# Patient Record
Sex: MALE | Race: WHITE | NOT HISPANIC OR LATINO | Employment: OTHER | URBAN - METROPOLITAN AREA
[De-identification: names, ages, dates, MRNs, and addresses within clinical notes are randomized per-mention and may not be internally consistent; named-entity substitution may affect disease eponyms.]

---

## 2021-02-16 ENCOUNTER — TELEPHONE (OUTPATIENT)
Dept: UROLOGY | Facility: AMBULATORY SURGERY CENTER | Age: 81
End: 2021-02-16

## 2021-02-16 NOTE — TELEPHONE ENCOUNTER
Call placed to patient and spoke with his wife  Offered patient appointment for 3-8-2021 at 11:30am in our [place] office  Informed the wife that patient may require a cystoscopy and this cannot be done at our [facility]  Wife understands and accepted this appointment in [place]

## 2021-02-16 NOTE — TELEPHONE ENCOUNTER
Reason for appointment/Complaint/Diagnosis : gross hematuria on and off for 2 weeks  pcp requesting appointment soon as possible      Insurance: Medicare a and b  [address] [place] [address]  [id number]    History of Cancer? [place]            If yes, what kind?n/a  Previous urologist?   unknown               Records requested/where?  no  Outside testing/where? no    Location Preference for office visit?  [facility]

## 2021-03-08 ENCOUNTER — PROCEDURE VISIT (OUTPATIENT)
Dept: UROLOGY | Facility: CLINIC | Age: 81
End: 2021-03-08
Payer: MEDICARE

## 2021-03-08 DIAGNOSIS — N13.8 BPH WITH OBSTRUCTION/LOWER URINARY TRACT SYMPTOMS: ICD-10-CM

## 2021-03-08 DIAGNOSIS — R31.0 GROSS HEMATURIA: Primary | ICD-10-CM

## 2021-03-08 DIAGNOSIS — N40.1 BPH WITH OBSTRUCTION/LOWER URINARY TRACT SYMPTOMS: ICD-10-CM

## 2021-03-08 DIAGNOSIS — R97.20 ELEVATED PSA: ICD-10-CM

## 2021-03-08 PROCEDURE — 99203 OFFICE O/P NEW LOW 30 MIN: CPT | Performed by: UROLOGY

## 2021-03-08 PROCEDURE — 52000 CYSTOURETHROSCOPY: CPT | Performed by: UROLOGY

## 2021-03-08 RX ORDER — CIPROFLOXACIN 500 MG/1
500 TABLET, FILM COATED ORAL ONCE
Qty: 1 TABLET | Refills: 0 | Status: SHIPPED | OUTPATIENT
Start: 2021-03-08 | End: 2021-03-08

## 2021-03-08 NOTE — PROGRESS NOTES
[address] [facility]  [facility]  [address]  [place], [address] [place] [address]  [phone]  www  [facility]  org      NAME: Po Herbert  AGE: 78 y o  SEX: male  : 1940   MRN: [id number]    DATE: 3/8/2021  TIME: 12:16 PM    Assessment and Plan :    Intermittent gross hematuria due to BPH with friable vessels  Reassured, and this does not happen very often  No further treatment for this right now  History of elevated PSA:  Recheck PSA in 6 months  He has not had a biopsy and this is been chronic  BPH with obstruction:  Very minimal symptoms, can live with the symptoms  Chief Complaint   No chief complaint on file  History of Present Illness      New patient office visit forgross hematuria:  Patient double booked to undergo cystoscopy when he had his wife called requesting appointment   Set up for cystoscopy  CT scan abdomen and pelvis done at [facility] showed enlarged prostate, normal kidneys without stones and no bladder lesions  Has no major voiding symptoms except for occasional intermittency of stream   He has seen Dr Mcclain in the past for elevated PSA- no biopsy- and was once on BPH meds and couldn't tolerate the side effects  He has had this episode of gross hematuria and 1 other episode 10 years ago  No other episodes  Cystoscopy     Date/Time 3/8/2021 11:50 AM     Performed by  Hiram Soto MD     Authorized by Hiram Soto MD      Universal Protocol:  Consent: Verbal consent obtained  Written consent obtained        Procedure Details:  Procedure type: cystoscopy    Additional Procedure Details: Cystoscopy Procedure Note        Pre-operative Diagnosis: Gross hematuria    Post-operative Diagnosis:  same , due to friable vessels and BPH    Procedure: Flexible cystoscopy    Surgeon: Jennie Rodriguez MD    Anesthesia: 1% Xylocaine per urethra    EBL: Minimal    Complications: none    Procedure Details The risks, benefits, complications, treatment options, and expected outcomes were discussed with the patient  The patient concurred with the proposed plan, giving informed consent  Cystoscopy was performed today under local anesthesia, using sterile technique  The patient was placed in the supine position, prepped with Betadine, and draped in the usual sterile fashion  The flexible cystocope was used to inspect both the urethra and bladder    Findings:  Urethra: normal without stricture  Prostate is moderately occlusive with very friable vessels of the surface and a moderate median lobe component  Bladder:    1+ trabeculatedand there were no stones tumors or other lesions  The orifices were orthotopic and intact  Specimens: none                 Complications:  None           Disposition: To home            Condition:  Stable          The following portions of the patient's history were reviewed and updated as appropriate: allergies, current medications, past family history, past medical history, past social history, past surgical history and problem list   History reviewed  No pertinent past medical history  History reviewed  No pertinent surgical history  shoulder  Review of Systems   Review of Systems   Genitourinary:        As per hospitals       Active Problem List   There is no problem list on file for this patient  Objective   There were no vitals taken for this visit  Physical Exam  Constitutional:       Appearance: Normal appearance  HENT:      Head: Normocephalic and atraumatic  Eyes:      Extraocular Movements: Extraocular movements intact  Neck:      Musculoskeletal: Normal range of motion  Pulmonary:      Effort: Pulmonary effort is normal    Genitourinary:     Penis: Normal        Scrotum/Testes: Normal       Prostate: Normal       Rectum: Normal       Comments: Rectal exam reveals normal tone, no masses and his prostate is about 70-80 g, smooth symmetric benign    No nodules  Musculoskeletal: Normal range of motion  Skin:     Coloration: Skin is not jaundiced or pale  Neurological:      General: No focal deficit present  Mental Status: He is alert and oriented to person, place, and time  Psychiatric:         Mood and Affect: Mood normal          Behavior: Behavior normal          Thought Content:  Thought content normal          Judgment: Judgment normal              Current Medications     Current Outpatient Medications:     ciprofloxacin (Cipro) 500 mg tablet, Take 1 tablet (500 mg total) by mouth once for 1 dose Take 1 dose after  procedure, Disp: 1 tablet, Rfl: 0        Mark Macdonald MD

## 2021-03-15 DIAGNOSIS — C43.59 MELANOMA OF BACK (HCC): Primary | ICD-10-CM

## 2021-04-07 ENCOUNTER — ANESTHESIA EVENT (OUTPATIENT)
Dept: PERIOP | Facility: HOSPITAL | Age: 81
End: 2021-04-07
Payer: MEDICARE

## 2021-04-14 ENCOUNTER — HOSPITAL ENCOUNTER (OUTPATIENT)
Dept: NUCLEAR MEDICINE | Facility: HOSPITAL | Age: 81
Discharge: HOME | End: 2021-04-14
Payer: MEDICARE

## 2021-04-14 ENCOUNTER — ANESTHESIA (OUTPATIENT)
Dept: PERIOP | Facility: HOSPITAL | Age: 81
End: 2021-04-14
Payer: MEDICARE

## 2021-04-14 DIAGNOSIS — C43.59 MELANOMA OF BACK (HCC): ICD-10-CM

## 2021-04-14 PROBLEM — I47.10 PAROXYSMAL SVT (SUPRAVENTRICULAR TACHYCARDIA) (HCC): Status: ACTIVE | Noted: 2021-04-14

## 2021-04-14 PROBLEM — I49.9 DYSRHYTHMIAS: Status: ACTIVE | Noted: 2021-04-14

## 2021-04-14 PROBLEM — I47.1 PAROXYSMAL SVT (SUPRAVENTRICULAR TACHYCARDIA) (HCC): Status: ACTIVE | Noted: 2021-04-14

## 2021-04-14 NOTE — ANESTHESIA PREPROCEDURE EVALUATION
Procedure:  UPPER BACK MELANOMA WIDE EXCISION (Right Back)  UPPER ARM MELANOMA WIDE EXCISION (Right Arm)  SENTINEL NODE BIOPSY (NUC MED INJECTION AT 0800) (Right Axilla)    Relevant Problems   CARDIO   (+) Dysrhythmias   (+) Paroxysmal SVT (supraventricular tachycardia) (HCC)      GI/HEPATIC   (+) GERD (gastroesophageal reflux disease)      PULMONARY   (+) Asthma   (+) COPD (chronic obstructive pulmonary disease) (HCC)      Other   (+) Disease of thyroid gland   (+) Melanoma (HCC)             Anesthesia Plan  ASA Score- 4     Anesthesia Type- general with ASA Monitors.         Additional Monitors:   Airway Plan: ETT.    Comment: Case cancelled after discussing risks and benefits with the surgeon. Further cardiology evaluation is required for optimization of paroxysmal SVT as well as what seems to be a new BBB. He had a cardiology evaluation for fatigue in May of 2020 and wore a 24 hour holter monitor which showed several episodes of non sustained SVT but he was not treated for this. .       Plan Factors-    Chart reviewed.             Induction- intravenous.    Postoperative Plan- Plan for postoperative opioid use. Planned trial extubation    Informed Consent- Anesthetic plan and risks discussed with patient.  I personally reviewed this patient with the CRNA. Discussed and agreed on the Anesthesia Plan with the CRNA..

## 2021-05-05 ENCOUNTER — TELEPHONE (OUTPATIENT)
Dept: UROLOGY | Facility: MEDICAL CENTER | Age: 81
End: 2021-05-05

## 2021-06-08 RX ORDER — DILTIAZEM HYDROCHLORIDE 120 MG/1
120 CAPSULE, EXTENDED RELEASE ORAL 2 TIMES DAILY
COMMUNITY

## 2021-06-08 RX ORDER — METOPROLOL SUCCINATE 50 MG/1
25 TABLET, EXTENDED RELEASE ORAL DAILY
COMMUNITY

## 2021-06-08 RX ORDER — PREDNISONE 1 MG/1
20 TABLET ORAL 2 TIMES DAILY WITH MEALS
COMMUNITY

## 2021-06-08 NOTE — PRE-PROCEDURE INSTRUCTIONS
Pre-Surgery Instructions:   Medication Instructions    albuterol (PROVENTIL HFA,VENTOLIN HFA) 90 mcg/act inhaler Instructed patient per Anesthesia Guidelines   budesonide (PULMICORT) 90 MCG/ACT inhaler Instructed patient per Anesthesia Guidelines   diltiazem (CARDIZEM SR) 120 mg 12 hr capsule Instructed patient per Anesthesia Guidelines   fluticasone-salmeterol (Advair Diskus) 250-50 mcg/dose inhaler Instructed patient per Anesthesia Guidelines   levothyroxine 137 mcg tablet Instructed patient per Anesthesia Guidelines   metoprolol succinate (TOPROL-XL) 50 mg 24 hr tablet Instructed patient per Anesthesia Guidelines   predniSONE 1 mg tablet Instructed patient per Anesthesia Guidelines   zolpidem (Ambien) 5 mg tablet Instructed patient per Anesthesia Guidelines  Pt/pt's wife was instructed that pt should use inhalers the am of surgery  Pt will be taking diltiazem, levothyroxine, toprol, and prednisone the am of surgery with a small sip of water

## 2021-06-10 ENCOUNTER — ANESTHESIA EVENT (OUTPATIENT)
Dept: PERIOP | Facility: HOSPITAL | Age: 81
End: 2021-06-10
Payer: MEDICARE

## 2021-06-13 NOTE — ANESTHESIA PREPROCEDURE EVALUATION
Procedure:  UPPER BACK AND UPPER ARM MELANOMA WIDE EXCISION (Right Back)  SENTINEL NODE BIOPSY (NUC MED INJECTION AT 0900) (Right Axilla)    Relevant Problems   CARDIO  Cardiac eval in Media section  Bundle block is old and stable  Holter (5/21) showed SVT is only short runs and pt started on Diltiazem  Echo 5/20 EF 70% and WNL     (+) Dysrhythmias   (+) Paroxysmal SVT (supraventricular tachycardia) (HCC)      GI/HEPATIC   (+) GERD (gastroesophageal reflux disease)      PULMONARY   (+) Asthma   (+) COPD (chronic obstructive pulmonary disease) (HCC)        Physical Exam    Airway    Mallampati score: III  TM Distance: >3 FB  Neck ROM: full     Dental       Cardiovascular  Cardiovascular exam normal    Pulmonary  Pulmonary exam normal     Other Findings        Anesthesia Plan  ASA Score- 3     Anesthesia Type- general with ASA Monitors  Additional Monitors:   Airway Plan:           Plan Factors-    Chart reviewed  Patient summary reviewed  Induction- intravenous  Postoperative Plan-     Informed Consent- Anesthetic plan and risks discussed with patient  I personally reviewed this patient with the CRNA  Discussed and agreed on the Anesthesia Plan with the CRNA  Elmo Irwin

## 2021-06-14 ENCOUNTER — HOSPITAL ENCOUNTER (OUTPATIENT)
Facility: HOSPITAL | Age: 81
Setting detail: OUTPATIENT SURGERY
Discharge: HOME | End: 2021-06-14
Attending: SURGERY | Admitting: SURGERY
Payer: MEDICARE

## 2021-06-14 ENCOUNTER — ANESTHESIA (OUTPATIENT)
Dept: PERIOP | Facility: HOSPITAL | Age: 81
End: 2021-06-14
Payer: MEDICARE

## 2021-06-14 ENCOUNTER — HOSPITAL ENCOUNTER (OUTPATIENT)
Dept: NUCLEAR MEDICINE | Facility: HOSPITAL | Age: 81
Discharge: HOME | End: 2021-06-14
Attending: SURGERY
Payer: MEDICARE

## 2021-06-14 VITALS
HEIGHT: 70 IN | BODY MASS INDEX: 28.35 KG/M2 | OXYGEN SATURATION: 95 % | HEART RATE: 65 BPM | SYSTOLIC BLOOD PRESSURE: 138 MMHG | WEIGHT: 198 LBS | DIASTOLIC BLOOD PRESSURE: 64 MMHG | TEMPERATURE: 97.9 F | RESPIRATION RATE: 18 BRPM

## 2021-06-14 DIAGNOSIS — C43.59 MALIGNANT MELANOMA OF OTHER PART OF TRUNK (HCC): ICD-10-CM

## 2021-06-14 PROCEDURE — 88307 TISSUE EXAM BY PATHOLOGIST: CPT | Performed by: PATHOLOGY

## 2021-06-14 PROCEDURE — 88305 TISSUE EXAM BY PATHOLOGIST: CPT | Performed by: PATHOLOGY

## 2021-06-14 PROCEDURE — A9541 TC99M SULFUR COLLOID: HCPCS

## 2021-06-14 PROCEDURE — G1004 CDSM NDSC: HCPCS

## 2021-06-14 PROCEDURE — 88342 IMHCHEM/IMCYTCHM 1ST ANTB: CPT | Performed by: PATHOLOGY

## 2021-06-14 PROCEDURE — 78195 LYMPH SYSTEM IMAGING: CPT

## 2021-06-14 PROCEDURE — 88341 IMHCHEM/IMCYTCHM EA ADD ANTB: CPT | Performed by: PATHOLOGY

## 2021-06-14 RX ORDER — HYDROMORPHONE HCL/PF 1 MG/ML
0.2 SYRINGE (ML) INJECTION
Status: DISCONTINUED | OUTPATIENT
Start: 2021-06-14 | End: 2021-06-14 | Stop reason: HOSPADM

## 2021-06-14 RX ORDER — MAGNESIUM HYDROXIDE 1200 MG/15ML
LIQUID ORAL AS NEEDED
Status: DISCONTINUED | OUTPATIENT
Start: 2021-06-14 | End: 2021-06-14 | Stop reason: HOSPADM

## 2021-06-14 RX ORDER — FENTANYL CITRATE 50 UG/ML
INJECTION, SOLUTION INTRAMUSCULAR; INTRAVENOUS AS NEEDED
Status: DISCONTINUED | OUTPATIENT
Start: 2021-06-14 | End: 2021-06-14

## 2021-06-14 RX ORDER — PROPOFOL 10 MG/ML
INJECTION, EMULSION INTRAVENOUS AS NEEDED
Status: DISCONTINUED | OUTPATIENT
Start: 2021-06-14 | End: 2021-06-14

## 2021-06-14 RX ORDER — SUCCINYLCHOLINE/SOD CL,ISO/PF 100 MG/5ML
SYRINGE (ML) INTRAVENOUS AS NEEDED
Status: DISCONTINUED | OUTPATIENT
Start: 2021-06-14 | End: 2021-06-14

## 2021-06-14 RX ORDER — ONDANSETRON 2 MG/ML
4 INJECTION INTRAMUSCULAR; INTRAVENOUS ONCE AS NEEDED
Status: DISCONTINUED | OUTPATIENT
Start: 2021-06-14 | End: 2021-06-14 | Stop reason: HOSPADM

## 2021-06-14 RX ORDER — BUPIVACAINE HYDROCHLORIDE AND EPINEPHRINE 2.5; 5 MG/ML; UG/ML
INJECTION, SOLUTION INFILTRATION; PERINEURAL AS NEEDED
Status: DISCONTINUED | OUTPATIENT
Start: 2021-06-14 | End: 2021-06-14 | Stop reason: HOSPADM

## 2021-06-14 RX ORDER — LIDOCAINE HYDROCHLORIDE 10 MG/ML
INJECTION, SOLUTION EPIDURAL; INFILTRATION; INTRACAUDAL; PERINEURAL AS NEEDED
Status: DISCONTINUED | OUTPATIENT
Start: 2021-06-14 | End: 2021-06-14

## 2021-06-14 RX ORDER — DEXAMETHASONE SODIUM PHOSPHATE 10 MG/ML
INJECTION, SOLUTION INTRAMUSCULAR; INTRAVENOUS AS NEEDED
Status: DISCONTINUED | OUTPATIENT
Start: 2021-06-14 | End: 2021-06-14

## 2021-06-14 RX ORDER — ISOSULFAN BLUE 50 MG/5ML
INJECTION, SOLUTION SUBCUTANEOUS AS NEEDED
Status: DISCONTINUED | OUTPATIENT
Start: 2021-06-14 | End: 2021-06-14 | Stop reason: HOSPADM

## 2021-06-14 RX ORDER — SODIUM CHLORIDE, SODIUM LACTATE, POTASSIUM CHLORIDE, CALCIUM CHLORIDE 600; 310; 30; 20 MG/100ML; MG/100ML; MG/100ML; MG/100ML
INJECTION, SOLUTION INTRAVENOUS CONTINUOUS PRN
Status: DISCONTINUED | OUTPATIENT
Start: 2021-06-14 | End: 2021-06-14

## 2021-06-14 RX ORDER — CEFAZOLIN SODIUM 2 G/50ML
2000 SOLUTION INTRAVENOUS ONCE
Status: COMPLETED | OUTPATIENT
Start: 2021-06-14 | End: 2021-06-14

## 2021-06-14 RX ORDER — ONDANSETRON 2 MG/ML
INJECTION INTRAMUSCULAR; INTRAVENOUS AS NEEDED
Status: DISCONTINUED | OUTPATIENT
Start: 2021-06-14 | End: 2021-06-14

## 2021-06-14 RX ORDER — EPHEDRINE SULFATE 50 MG/ML
INJECTION INTRAVENOUS AS NEEDED
Status: DISCONTINUED | OUTPATIENT
Start: 2021-06-14 | End: 2021-06-14

## 2021-06-14 RX ORDER — DEXAMETHASONE SODIUM PHOSPHATE 4 MG/ML
8 INJECTION, SOLUTION INTRA-ARTICULAR; INTRALESIONAL; INTRAMUSCULAR; INTRAVENOUS; SOFT TISSUE ONCE AS NEEDED
Status: DISCONTINUED | OUTPATIENT
Start: 2021-06-14 | End: 2021-06-14 | Stop reason: HOSPADM

## 2021-06-14 RX ORDER — OXYCODONE HYDROCHLORIDE AND ACETAMINOPHEN 5; 325 MG/1; MG/1
1 TABLET ORAL EVERY 4 HOURS PRN
Qty: 15 TABLET | Refills: 0 | Status: SHIPPED | OUTPATIENT
Start: 2021-06-14

## 2021-06-14 RX ORDER — FENTANYL CITRATE/PF 50 MCG/ML
25 SYRINGE (ML) INJECTION
Status: DISCONTINUED | OUTPATIENT
Start: 2021-06-14 | End: 2021-06-14 | Stop reason: HOSPADM

## 2021-06-14 RX ADMIN — PHENYLEPHRINE HYDROCHLORIDE 100 MCG: 10 INJECTION INTRAVENOUS at 12:29

## 2021-06-14 RX ADMIN — FENTANYL CITRATE 50 MCG: 50 INJECTION, SOLUTION INTRAMUSCULAR; INTRAVENOUS at 11:48

## 2021-06-14 RX ADMIN — PHENYLEPHRINE HYDROCHLORIDE 100 MCG: 10 INJECTION INTRAVENOUS at 11:20

## 2021-06-14 RX ADMIN — PROPOFOL 50 MG: 10 INJECTION, EMULSION INTRAVENOUS at 11:45

## 2021-06-14 RX ADMIN — PHENYLEPHRINE HYDROCHLORIDE 100 MCG: 10 INJECTION INTRAVENOUS at 11:57

## 2021-06-14 RX ADMIN — PROPOFOL 150 MG: 10 INJECTION, EMULSION INTRAVENOUS at 10:19

## 2021-06-14 RX ADMIN — FENTANYL CITRATE 50 MCG: 50 INJECTION, SOLUTION INTRAMUSCULAR; INTRAVENOUS at 10:19

## 2021-06-14 RX ADMIN — Medication 120 MG: at 10:19

## 2021-06-14 RX ADMIN — ONDANSETRON 4 MG: 2 INJECTION INTRAMUSCULAR; INTRAVENOUS at 10:19

## 2021-06-14 RX ADMIN — EPHEDRINE SULFATE 5 MG: 50 INJECTION, SOLUTION INTRAVENOUS at 10:42

## 2021-06-14 RX ADMIN — DEXAMETHASONE SODIUM PHOSPHATE 4 MG: 10 INJECTION, SOLUTION INTRAMUSCULAR; INTRAVENOUS at 10:19

## 2021-06-14 RX ADMIN — PHENYLEPHRINE HYDROCHLORIDE 200 MCG: 10 INJECTION INTRAVENOUS at 12:00

## 2021-06-14 RX ADMIN — EPHEDRINE SULFATE 5 MG: 50 INJECTION, SOLUTION INTRAVENOUS at 10:32

## 2021-06-14 RX ADMIN — PHENYLEPHRINE HYDROCHLORIDE 100 MCG: 10 INJECTION INTRAVENOUS at 11:41

## 2021-06-14 RX ADMIN — EPHEDRINE SULFATE 5 MG: 50 INJECTION, SOLUTION INTRAVENOUS at 10:52

## 2021-06-14 RX ADMIN — EPHEDRINE SULFATE 5 MG: 50 INJECTION, SOLUTION INTRAVENOUS at 12:09

## 2021-06-14 RX ADMIN — PROPOFOL 50 MG: 10 INJECTION, EMULSION INTRAVENOUS at 11:46

## 2021-06-14 RX ADMIN — EPHEDRINE SULFATE 5 MG: 50 INJECTION, SOLUTION INTRAVENOUS at 11:03

## 2021-06-14 RX ADMIN — PHENYLEPHRINE HYDROCHLORIDE 100 MCG: 10 INJECTION INTRAVENOUS at 12:26

## 2021-06-14 RX ADMIN — EPHEDRINE SULFATE 5 MG: 50 INJECTION, SOLUTION INTRAVENOUS at 10:39

## 2021-06-14 RX ADMIN — SODIUM CHLORIDE, SODIUM LACTATE, POTASSIUM CHLORIDE, AND CALCIUM CHLORIDE: .6; .31; .03; .02 INJECTION, SOLUTION INTRAVENOUS at 10:14

## 2021-06-14 RX ADMIN — CEFAZOLIN SODIUM 2000 MG: 2 SOLUTION INTRAVENOUS at 10:14

## 2021-06-14 RX ADMIN — FENTANYL CITRATE 50 MCG: 50 INJECTION, SOLUTION INTRAMUSCULAR; INTRAVENOUS at 10:42

## 2021-06-14 RX ADMIN — PHENYLEPHRINE HYDROCHLORIDE 200 MCG: 10 INJECTION INTRAVENOUS at 12:12

## 2021-06-14 RX ADMIN — LIDOCAINE HYDROCHLORIDE 100 MG: 10 INJECTION, SOLUTION EPIDURAL; INFILTRATION; INTRACAUDAL; PERINEURAL at 10:19

## 2021-06-14 NOTE — OP NOTE
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 ([address] ) [C43 59]    Procedure(s) (LRB):  UPPER BACK AND UPPER ARM MELANOMA WIDE EXCISION (Right)  SENTINEL NODE BIOPSY (NUC MED INJECTION AT 0900) RIGHT AXIALLARY X3 (Right)    Specimen(s):  ID Type Source Tests Collected by Time Destination   1 : right upper back wide excision melanoma,  Tissue Back TISSUE EXAM Sandy Arteaga, DO 2021 10:52 AM    2 : right upper arm wide excision melanoma Tissue Arm, Right TISSUE EXAM Sandy Lentzgeeta, DO 2021 11:21 AM    3 : [place] lymph node #1 Tissue Lymph Node, [place] TISSUE EXAM Sandykem Arteaga, DO 2021 12:23 PM    4 : [place] lymph node #2 Tissue Lymph Node, [place] TISSUE EXAM Sandykem Arteaga, DO 2021 12:29 PM    5 : Right axillary tissue Tissue Axillary TISSUE EXAM Sandy Lentzgeeta, DO 2021 12:30 PM    6 : sentinel lymph node #3 Tissue Lymph Node, [place] TISSUE EXAM Sandy Lentzgeeta, DO 2021 12:34 PM        Estimated Blood Loss:   Minimal    Drains:  * No LDAs found *    Anesthesia Type:   General    Operative Indications:  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 ([facility] ) [C43 59]  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    Operative Findings:  Old scar of right upper back and scar the right arm consistent with prior biopsy and pathology reports of melanoma  The axilla has no enlarged lymph nodes  Complications:   None    Procedure and Technique:  The patient is identified preoperatively and laterality site was with the patient    After lymphoscintigraphy was performed to the right axillary site was also confirmed for right axillary node biopsy  The patient is taken to the operating room and then identified and a time-out was performed with all members of the team present  The patient was placed under general endotracheal anesthesia and then placed left lateral recumbent position with all pressure padded  His right upper back and right arm was prepped in a sterile manner ChloraPrep  Then local anesthetic 0 25 Marcaine with epinephrine is instilled at the back  Three and 4 cm margins were taken around the old scar and a circular incision was down to the deep fatty tissue  The specimen of right upper back measured 5 x 4 x 1 5 cm  It was sent for specimen after marking short dark suture superior and short suture lateral   The wound was then closed with 2 layers of interrupted 2-0 Vicryl suture 2 layers of 3-0 Vicryl suture and 1 layer interrupted vertical mattress 3-0 Ethilon suture  Undermining of the tissue was performed prior to closure  We then proceeded to the right upper arm and I measured 2 cm around the entire biopsy site and local anesthetic 0 25% Marcaine with epinephrine is instilled at this area  An elliptical incision was made including margin  I dissected down to the deep fatty tissue of the right upper arm  A short suture was used to drea the proximal aspect and long short suture was used to drea anterior aspect specimen  The specimen measured 3 5 x 3 6 x 1 5 cm  Was closed with layers of interrupted 2-0 Vicryl suture 1 layer with 3-0 Vicryl suture and then 3-0 Ethilon suture in a vertical mattress fashion was used to close skin  Gauze dressings were placed each of these areas sponge needle sharp instrument counts were correct x2 for each of these areas  All drapes were taken down everyone changed gowns and gloves the patient was placed in the supine position with his right arm out to his side and pressure points padded    The right axilla was then prepped and draped in a sterile manner using ChloraPrep  Local anesthetic 0 25% Marcaine epinephrine instilled at the inferior hairline axilla  Using Neoprobe identified an area of increased activity and a curvilinear incision was made  I dissected down the area of increased activity and the 1st sentinel lymph node had increased activity this was excised using sharp dissection and hemoclips were used for hemostasis the ex vivo counts for sentinel lymph node 1 were 249, 292, 267, and 307  I then noted in the other area of increased activity this was dissected free from the adjacent tissue and hemoclips were used for hemostasis the sentinel lymph node 2 ex vivo counts were 137, 167, 148, 143  I then noted a no other area of increased activity in the right axilla   I dissected this area tissue free from the adjacent tissue and hemoclips were used for hemostasis  The ex vivo counts were 385, 431, and 445  There was no other area of increased activity the basin counts were less than 20  I then irrigated with saline lavage and suction out the irrigant the wound was closed with 1 layer of 2-0 Vicryl suture then 3-0 Vicryl suture and then 4 Vicryl suture for skin and a gauze dressing was applied  Patient tolerated the procedure well sponge needle sharp instrument counts were correct x2  Please note that prior to the beginning of the procedure 5 mL of Lymphazurin blue was injected at the right upper back scar area  None of the lymph nodes revealed blue dye  Patient was extubated and transferred to recovery room in satisfactory stable condition       I was present for the entire procedure and A physician assistant was required during the procedure for retraction tissue handling,dissection and suturing    Patient Disposition:  PACU     SIGNATURE: Yash Johnston DO  DATE: June 14, 2021  TIME: 12:58 PM

## 2021-06-14 NOTE — DISCHARGE INSTRUCTIONS
Remove dressings in 48 hours  No heavy lifting greater than 15 lb  No strenuous activity especially with the right arm  May shower with soap water after 48 hours  Cover the area as needed  Call if any increased pain redness or drainage

## 2021-06-14 NOTE — H&P
H&P Exam - Keshav Fox 78 y o  male MRN: [id number]    Unit/Bed#: [facility] Encounter: [id number]    Assessment:  Melanoma of the right upper back and right arm    Plan: Wide excision of the melanoma of the righrt upper back and right arm with sentinel node biopsy for the right upper back area  History of Present Illness   The pt presents with c/o a changing skin lesion of the right upper back and upper arm   The biopsy revealed a melanoma in situ of the upper arm and a melanoma of the right upper back area  Denies any drainage or problems from the biopsy sites  Review of Systems   Constitutional: Negative  HENT: Negative  Respiratory: Positive for shortness of breath and wheezing  Cardiovascular: Negative  Gastrointestinal: Negative  Genitourinary: Negative  Neurological: Negative  Hematological: Negative  Historical Information   Past Medical History:   Diagnosis Date    Asthma     BBB (bundle branch block)     Chronic kidney disease     Pt's last eGFR was 58 9 2/21; Latest from 4/19/21 is 52    COPD (chronic obstructive pulmonary disease) ([facility] [address] )     Disease of thyroid gland     Dysphagia     Fatigue 05/2020    Pt went to see cardiologist for fatigue  Had a full cardiac work up   GERD (gastroesophageal reflux disease)     Pt no longer has issues   History of elevated PSA     Pt is being followed by Urology   [place]     Melanoma ([facility] [address] )     Monoclonal gammopathy     Pt is asymptomatic- followed by Hem/Onc   Osteopenia     Osteoporosis     Prediabetes     Requires supplemental oxygen     Pt has home O2 available at home but has not had to use in over 5 months per pt's wife      Shortness of breath     Sleep difficulties     Pt has a hard time staying asleep- pt uses ambien nightly    [facility]     Tarsal tunnel syndrome     bilateral feet    Wears glasses     Zenker diverticula     Pt had surgically closed  Past Surgical History:   Procedure Laterality Date    CATARACT EXTRACTION Bilateral     COLONOSCOPY      CRICOPHARYNGEAL MYOTOMY      EGD      2016, 2017    PARAESOPHAGEAL HERNIA REPAIR      SKIN BIOPSY      TONSILLECTOMY       Social History   Social History     Substance and Sexual Activity   Alcohol Use Yes    Alcohol/week: 1 0 - 2 0 standard drinks    Types: 1 - 2 Cans of beer per week     Social History     Substance and Sexual Activity   Drug Use Not Currently     Social History     Tobacco Use   Smoking Status Former Smoker    Types: Cigarettes   Smokeless Tobacco Never Used   Tobacco Comment    Pt quit when he was 43years old     E-Cigarette Use: Never User     E-Cigarette/Vaping Substances       Family History: History reviewed  No pertinent family history  Meds/Allergies   PTA meds:   Prior to Admission Medications   Prescriptions Last Dose Informant Patient Reported? Taking? albuterol (PROVENTIL HFA,VENTOLIN HFA) 90 mcg/act inhaler   Yes Yes   Sig: Inhale 2 puffs every 6 (six) hours as needed for wheezing   budesonide (PULMICORT) 90 MCG/ACT inhaler   Yes Yes   Sig: Inhale 1 puff 2 (two) times a day   diltiazem (CARDIZEM SR) 120 mg 12 hr capsule   Yes Yes   Sig: Take 120 mg by mouth 2 (two) times a day   fluticasone-salmeterol (Advair Diskus) 250-50 mcg/dose inhaler   Yes Yes   Sig: Inhale 1 puff 2 (two) times a day Rinse mouth after use  levothyroxine 137 mcg tablet   Yes Yes   Sig: Take 137 mcg by mouth daily   metoprolol succinate (TOPROL-XL) 50 mg 24 hr tablet 6/14/2021 at Unknown time  Yes Yes   Sig: Take 25 mg by mouth daily Pt is to take for 5 days before surgery and day of surgery  Prescribed by Dr Kat Puente     predniSONE 1 mg tablet   Yes Yes   Sig: Take 20 mg by mouth 2 (two) times a day with meals   tadalafil (CIALIS) 20 MG tablet   Yes No   Sig: Take 20 mg by mouth daily as needed for erectile dysfunction    zolpidem (Ambien) 5 mg tablet   Yes Yes   Sig: Take 5 mg by mouth daily at bedtime as needed for sleep      Facility-Administered Medications: None     Allergies   Allergen Reactions    Iodinated Diagnostic Agents Anaphylaxis    Metrizamide Anaphylaxis       Objective   First Vitals:   Height: 5' 10" (177 8 cm) (06/08/21 1119)  Weight - Scale: 89 8 kg (198 lb) (06/08/21 1119)    Current Vitals:   Height: 5' 10" (177 8 cm) (06/08/21 1119)  Weight - Scale: 89 8 kg (198 lb) (06/08/21 1119)    No intake or output data in the 24 hours ending 06/14/21 0811    Invasive Devices     None                 Physical Exam  Constitutional:       Appearance: Normal appearance  HENT:      Nose: Nose normal       Mouth/Throat:      Mouth: Mucous membranes are moist    Cardiovascular:      Rate and Rhythm: Normal rate and regular rhythm  Pulses: Normal pulses  Heart sounds: Normal heart sounds  Pulmonary:      Effort: Pulmonary effort is normal       Breath sounds: Wheezing and rhonchi present  Abdominal:      General: Abdomen is flat  Palpations: Abdomen is soft  There is no mass  Tenderness: There is no abdominal tenderness  Musculoskeletal:         General: No swelling or tenderness  Cervical back: Normal range of motion and neck supple  Skin:     General: Skin is warm  Comments: Scar noted of the right upper back and right upper arm areas  Neurological:      General: No focal deficit present  Mental Status: He is alert and oriented to person, place, and time

## 2021-06-14 NOTE — ANESTHESIA POSTPROCEDURE EVALUATION
Post-Op Assessment Note    CV Status:  Stable  Pain Score: 0    Pain management: adequate     Mental Status:  Arousable   Hydration Status:  Euvolemic   PONV Controlled:  Controlled   Airway Patency:  Patent   Two or more mitigation strategies used for obstructive sleep apnea   Post Op Vitals Reviewed: Yes      Staff: CRNA         No complications documented      BP  176/79    Temp   97 8   Pulse  66   Resp   16   SpO2   98

## 2021-06-14 NOTE — DISCHARGE SUMMARY
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 ([facility] [address] ) [C43 59]    HPI:  The patient has a history of melanoma of the right upper back melanoma situ right upper he presents for wide excision thereof and a sentinel node biopsy  Procedures Performed: No orders of the defined types were placed in this encounter  Summary of Hospital Course: The patient presents for wide excision of melanoma of the upper back and right arm and right axillary sentinel node biopsy after lymphoscintigraphy was performed  The patient underwent the aforementioned procedure without difficulty and was discharged later on 06/14 without incident  Significant Findings, Care, Treatment and Services Provided:  Melanoma of the right upper back and right upper arm  Complications:  None    Discharge Diagnosis:  Melanoma right upper back and right upper arm    Medical Problems     Resolved Problems  Date Reviewed: 3/8/2021    None                Condition at Discharge: good         Discharge instructions/Information to patient and family:   See after visit summary for information provided to patient and family  Provisions for Follow-Up Care:  See after visit summary for information related to follow-up care and any pertinent home health orders  PCP: Eliana Bill MD    Disposition: Home    Planned Readmission: No      Discharge Statement   I spent 15 minutes discharging the patient  This time was spent on the day of discharge  I had direct contact with the patient on the day of discharge  Additional documentation is required if more than 30 minutes were spent on discharge  Discharge Medications:  See after visit summary for reconciled discharge medications provided to patient and family

## (undated) DEVICE — SUT VICRYL 3-0 CT-1 27 IN J258H

## (undated) DEVICE — DRAPE SHEET THREE QUARTER

## (undated) DEVICE — SUT VICRYL 4-0 PS-2 27 IN J426H

## (undated) DEVICE — HEMOCLIP CARTRIDGE MED

## (undated) DEVICE — MEDI-VAC YANK SUCT HNDL W/TPRD BULBOUS TIP: Brand: CARDINAL HEALTH

## (undated) DEVICE — LIGACLIP MCA MULTIPLE CLIP APPLIERS, 20 MEDIUM CLIPS: Brand: LIGACLIP

## (undated) DEVICE — NEEDLE 18 G X 1 1/2 SAFETY

## (undated) DEVICE — TUBING SUCTION 5MM X 12 FT

## (undated) DEVICE — CHLORAPREP HI-LITE 26ML ORANGE

## (undated) DEVICE — DRAPE TOWEL: Brand: CONVERTORS

## (undated) DEVICE — SUT ETHILON 3-0 PS-1 18 IN 1663G

## (undated) DEVICE — NEEDLE 25G X 1 1/2

## (undated) DEVICE — DRAPE EQUIPMENT RF WAND

## (undated) DEVICE — SCD SEQUENTIAL COMPRESSION COMFORT SLEEVE MEDIUM KNEE LENGTH: Brand: KENDALL SCD

## (undated) DEVICE — 4-PORT MANIFOLD: Brand: NEPTUNE 2

## (undated) DEVICE — SYRINGE 5ML LL

## (undated) DEVICE — GLOVE INDICATOR PI UNDERGLOVE SZ 7 BLUE

## (undated) DEVICE — PAD GROUNDING ADULT

## (undated) DEVICE — PLUMEPEN PRO 10FT

## (undated) DEVICE — TIBURON SPLIT SHEET: Brand: CONVERTORS

## (undated) DEVICE — INTENDED FOR TISSUE SEPARATION, AND OTHER PROCEDURES THAT REQUIRE A SHARP SURGICAL BLADE TO PUNCTURE OR CUT.: Brand: BARD-PARKER SAFETY BLADES SIZE 15, STERILE

## (undated) DEVICE — BETHLEHEM UNIVERSAL MINOR GEN: Brand: CARDINAL HEALTH

## (undated) DEVICE — SUT SILK 3-0 SH 30 IN K832H

## (undated) DEVICE — GAUZE SPONGES,16 PLY: Brand: CURITY

## (undated) DEVICE — SUT VICRYL 2-0 CT-1 27 IN J259H

## (undated) DEVICE — ADHESIVE SKIN HIGH VISCOSITY EXOFIN 1ML

## (undated) DEVICE — SPECIMEN CONTAINER STERILE PEEL PACK

## (undated) DEVICE — 3M™ TEGADERM™ TRANSPARENT FILM DRESSING FRAME STYLE, 1626W, 4 IN X 4-3/4 IN (10 CM X 12 CM), 50/CT 4CT/CASE: Brand: 3M™ TEGADERM™

## (undated) DEVICE — GLOVE SRG BIOGEL ECLIPSE 7

## (undated) DEVICE — SUT VICRYL 2-0 SHB 27 IN JB417

## (undated) DEVICE — PROVE COVER: Brand: UNBRANDED

## (undated) DEVICE — SUT VICRYL 4-0 PS-2 18 IN J496G